# Patient Record
Sex: MALE | URBAN - METROPOLITAN AREA
[De-identification: names, ages, dates, MRNs, and addresses within clinical notes are randomized per-mention and may not be internally consistent; named-entity substitution may affect disease eponyms.]

---

## 2017-05-25 ENCOUNTER — MYC MEDICAL ADVICE (OUTPATIENT)
Dept: FAMILY MEDICINE | Facility: CLINIC | Age: 32
End: 2017-05-25

## 2017-05-25 ENCOUNTER — E-VISIT (OUTPATIENT)
Dept: FAMILY MEDICINE | Facility: CLINIC | Age: 32
End: 2017-05-25
Payer: COMMERCIAL

## 2017-05-25 DIAGNOSIS — F41.9 ANXIETY: Primary | ICD-10-CM

## 2017-05-25 PROCEDURE — 98969 ZZC NONPHYSICIAN ONLINE ASSESSMENT AND MANAGEMENT: CPT | Performed by: NURSE PRACTITIONER

## 2017-05-25 NOTE — MR AVS SNAPSHOT
After Visit Summary   5/25/2017    Candelario Rojas    MRN: [id number]           Patient Information     Date Of Birth          1985        Visit Information        Provider Department      5/25/2017 4:28 PM Dora Wheeler NP [facility]        Today's Diagnoses     Anxiety    -  1       Follow-ups after your visit        Who to contact     If you have questions or need follow up information about today's clinic visit or your schedule please contact [facility] directly at [phone].  Normal or non-critical lab and imaging results will be communicated to you by NanoVeloshart, letter or phone within 4 business days after the clinic has received the results. If you do not hear from us within 7 days, please contact the clinic through Minitradet or phone. If you have a critical or abnormal lab result, we will notify you by phone as soon as possible.  Submit refill requests through Troppin or call your pharmacy and they will forward the refill request to us. Please allow 3 business days for your refill to be completed.          Additional Information About Your Visit        MyChart Information     Troppin gives you secure access to your electronic health record. If you see a primary care provider, you can also send messages to your care team and make appointments. If you have questions, please call your primary care clinic.  If you do not have a primary care provider, please call [phone] and they will assist you.        Care EveryWhere ID     This is your Care EveryWhere ID. This could be used by other organizations to access your [place] medical records  [id number]         Blood Pressure from Last 3 Encounters:   10/25/16 110/60   03/30/16 127/80   09/14/15 122/86    Weight from Last 3 Encounters:   10/25/16 239 lb (108.4 kg)   03/30/16 241 lb (109.3 kg)   09/14/15 249 lb (112.9 kg)              Today, you had the following     No orders found for display        Primary Care Provider Office Phone # Fax #    Dora Wheeler [phone]       [facility] [address]  [facility] [address]        Thank you!     Thank you for choosing [facility]  for your care. Our goal is always to provide you with excellent care. Hearing back from our patients is one way we can continue to improve our services. Please take a few minutes to complete the written survey that you may receive in the mail after your visit with us. Thank you!             Your Updated Medication List - Protect others around you: Learn how to safely use, store and throw away your medicines at www.disposemymeds.org.          This list is accurate as of: 5/25/17 11:59 PM.  Always use your most recent med list.                   Brand Name Dispense Instructions for use    cholecalciferol 1000 UNIT tablet    vitamin D    100 tablet    Take 5 tablets (5,000 Units) by mouth daily

## 2017-05-26 ENCOUNTER — MYC MEDICAL ADVICE (OUTPATIENT)
Dept: FAMILY MEDICINE | Facility: CLINIC | Age: 32
End: 2017-05-26

## 2017-05-26 NOTE — TELEPHONE ENCOUNTER
This visit is deferred to PCP. Does not need to be addressed by DOD.    Arthur Sultana MD  Family Medicine Physician

## 2017-05-30 NOTE — TELEPHONE ENCOUNTER
Took FMLA paperwork from PCP's mailbox and placed in forms folder for completion.    Марина Ray MA

## 2017-08-28 ENCOUNTER — E-VISIT (OUTPATIENT)
Dept: FAMILY MEDICINE | Facility: CLINIC | Age: 32
End: 2017-08-28

## 2017-08-28 DIAGNOSIS — F41.9 ANXIETY: Primary | ICD-10-CM

## 2017-08-28 NOTE — MR AVS SNAPSHOT
After Visit Summary   8/28/2017    Candelario Rojas    MRN: [id number]           Patient Information     Date Of Birth          1985        Visit Information        Provider Department      8/28/2017 9:45 AM Dora Wheeler NP [facility]        Today's Diagnoses     Anxiety    -  1       Follow-ups after your visit        Who to contact     If you have questions or need follow up information about today's clinic visit or your schedule please contact [facility] directly at [phone].  Normal or non-critical lab and imaging results will be communicated to you by Stormpathhart, letter or phone within 4 business days after the clinic has received the results. If you do not hear from us within 7 days, please contact the clinic through TouristWayt or phone. If you have a critical or abnormal lab result, we will notify you by phone as soon as possible.  Submit refill requests through YABUY or call your pharmacy and they will forward the refill request to us. Please allow 3 business days for your refill to be completed.          Additional Information About Your Visit        MyChart Information     YABUY gives you secure access to your electronic health record. If you see a primary care provider, you can also send messages to your care team and make appointments. If you have questions, please call your primary care clinic.  If you do not have a primary care provider, please call [phone] and they will assist you.        Care EveryWhere ID     This is your Care EveryWhere ID. This could be used by other organizations to access your [place] medical records  [id number]         Blood Pressure from Last 3 Encounters:   10/25/16 110/60   03/30/16 127/80   09/14/15 122/86    Weight from Last 3 Encounters:   10/25/16 239 lb (108.4 kg)   03/30/16 241 lb (109.3 kg)   09/14/15 249 lb (112.9 kg)              Today, you had the following     No orders found for display        Primary Care Provider Office Phone # Fax #    Dora RIVERS CHIARA Wheeler [phone] [address] [place]  [facility] [address]        Equal Access to Services     ANDRE LOVING : Leela alas hadmarialuisao Soomaali, waaxda luqadaha, qaybta kaalmada adeegyada, paul thornesandro manzanareskobi blanc laJosé Miguelwolf wayne. So [facility] [phone].    ATENCIÓN: Si habla español, tiene a cottrell disposición servicios gratuitos de asistencia lingüística. Llame al [phone].    We comply with applicable federal civil rights laws and [place] laws. We do not discriminate on the basis of race, color, national origin, age, disability sex, sexual orientation or gender identity.            Thank you!     Thank you for choosing [facility]  for your care. Our goal is always to provide you with excellent care. Hearing back from our patients is one way we can continue to improve our services. Please take a few minutes to complete the written survey that you may receive in the mail after your visit with us. Thank you!             Your Updated Medication List - Protect others around you: Learn how to safely use, store and throw away your medicines at www.disposemymeds.org.          This list is accurate as of: 8/28/17 11:59 PM.  Always use your most recent med list.                   Brand Name Dispense Instructions for use Diagnosis    cholecalciferol 1000 UNIT tablet    vitamin D    100 tablet    Take 5 tablets (5,000 Units) by mouth daily    Vitamin D deficiency

## 2017-08-28 NOTE — TELEPHONE ENCOUNTER
Dr Barrera, any suggestions on how best to handle this. I would typically have him come in to meet with me in order for me to complete [place] paperwork but appreciate your thoughts.    Arthur Sultana MD  Family Medicine Physician

## 2017-08-29 NOTE — TELEPHONE ENCOUNTER
Dr. Barrera,   Please see communication regarding patient FMLA. From 8/28.  Please advise.    Thanks! Bette Decker RN

## 2017-08-29 NOTE — TELEPHONE ENCOUNTER
LMOM and suggested an appt but he can call to discuss. Relayed the covering providers comments below.    STELLA FMLA form renewal possibly:  OV or Online visit?

## 2017-09-01 NOTE — TELEPHONE ENCOUNTER
Dr. Bhavik Sultana/ Dr. Barrera  -- not sure where this is at; he needs to have paper work completed by 9/6. Can this be a phone visit with someone? Please advise how to proceed.      Thank you  ANNIKA OsorioN, RN  [facility]

## 2017-09-01 NOTE — TELEPHONE ENCOUNTER
Lelia --    Please see below regarding request to extend FMLA. Pt needs this by 9/6. Covering providers advised to wait for you.    Please review and advise.    Elisa Morelos, NANIKAN, RN  [facility]

## 2017-09-01 NOTE — TELEPHONE ENCOUNTER
Unfortunately if he is not able to come in for a visit in person and lives in [place], I do not feel comfortable completing these papers. It is possible that Lelia who is back on 9/5/17 will feel comfortable doing this. My best recommendation is to check with Lelia first thing when she's back on Tuesday AM. It's likely a very simple request at that point if it is as patient states if not, it would require an in person visit.    Arthur Sultana MD  Family Medicine Physician

## 2017-09-11 NOTE — TELEPHONE ENCOUNTER
Called and LM to CB.    We have not received [facility] paperwork. Per bLife message pt was asked to fax paperwork in, GooseChasehart from 9/5/17 was not read.    Марина Ray MA

## 2017-09-18 NOTE — TELEPHONE ENCOUNTER
Patient has not responded to numerous attempts to reach him . Closing encounter.       Arthur Krishnamurthy MA

## 2019-11-04 ENCOUNTER — HEALTH MAINTENANCE LETTER (OUTPATIENT)
Age: 34
End: 2019-11-04

## 2020-11-22 ENCOUNTER — HEALTH MAINTENANCE LETTER (OUTPATIENT)
Age: 35
End: 2020-11-22

## 2021-09-19 ENCOUNTER — HEALTH MAINTENANCE LETTER (OUTPATIENT)
Age: 36
End: 2021-09-19

## 2022-01-08 ENCOUNTER — HEALTH MAINTENANCE LETTER (OUTPATIENT)
Age: 37
End: 2022-01-08

## 2022-11-20 ENCOUNTER — HEALTH MAINTENANCE LETTER (OUTPATIENT)
Age: 37
End: 2022-11-20

## 2023-04-15 ENCOUNTER — HEALTH MAINTENANCE LETTER (OUTPATIENT)
Age: 38
End: 2023-04-15